# Patient Record
Sex: FEMALE | Race: WHITE | NOT HISPANIC OR LATINO | Employment: FULL TIME | URBAN - METROPOLITAN AREA
[De-identification: names, ages, dates, MRNs, and addresses within clinical notes are randomized per-mention and may not be internally consistent; named-entity substitution may affect disease eponyms.]

---

## 2023-11-21 DIAGNOSIS — Z76.89 ENCOUNTER TO ESTABLISH CARE: Primary | ICD-10-CM

## 2023-11-21 DIAGNOSIS — Z00.00 ROUTINE ADULT HEALTH MAINTENANCE: ICD-10-CM

## 2023-11-27 ENCOUNTER — OFFICE VISIT (OUTPATIENT)
Dept: CARDIOLOGY | Facility: CLINIC | Age: 53
End: 2023-11-27
Payer: COMMERCIAL

## 2023-11-27 ENCOUNTER — HOSPITAL ENCOUNTER (OUTPATIENT)
Dept: CARDIOLOGY | Facility: HOSPITAL | Age: 53
Discharge: HOME | End: 2023-11-27
Attending: INTERNAL MEDICINE
Payer: COMMERCIAL

## 2023-11-27 VITALS
DIASTOLIC BLOOD PRESSURE: 90 MMHG | HEART RATE: 76 BPM | OXYGEN SATURATION: 99 % | SYSTOLIC BLOOD PRESSURE: 120 MMHG | BODY MASS INDEX: 28.36 KG/M2 | HEIGHT: 62 IN | WEIGHT: 154.13 LBS

## 2023-11-27 DIAGNOSIS — R42 DIZZINESS: ICD-10-CM

## 2023-11-27 DIAGNOSIS — I10 PRIMARY HYPERTENSION: ICD-10-CM

## 2023-11-27 DIAGNOSIS — Z91.89 AT RISK FOR SLEEP APNEA: Primary | ICD-10-CM

## 2023-11-27 DIAGNOSIS — Z76.89 ENCOUNTER TO ESTABLISH CARE: ICD-10-CM

## 2023-11-27 DIAGNOSIS — Z00.00 ROUTINE ADULT HEALTH MAINTENANCE: ICD-10-CM

## 2023-11-27 PROCEDURE — 3008F PR BODY MASS INDEX (BMI) DOCUMENTED: ICD-10-PCS | Mod: CPTII,S$GLB,, | Performed by: INTERNAL MEDICINE

## 2023-11-27 PROCEDURE — 99999 PR PBB SHADOW E&M-EST. PATIENT-LVL III: CPT | Mod: PBBFAC,,, | Performed by: INTERNAL MEDICINE

## 2023-11-27 PROCEDURE — 93010 ELECTROCARDIOGRAM REPORT: CPT | Mod: ,,, | Performed by: INTERNAL MEDICINE

## 2023-11-27 PROCEDURE — 4010F PR ACE/ARB THEARPY RXD/TAKEN: ICD-10-PCS | Mod: CPTII,S$GLB,, | Performed by: INTERNAL MEDICINE

## 2023-11-27 PROCEDURE — 3080F PR MOST RECENT DIASTOLIC BLOOD PRESSURE >= 90 MM HG: ICD-10-PCS | Mod: CPTII,S$GLB,, | Performed by: INTERNAL MEDICINE

## 2023-11-27 PROCEDURE — 3008F BODY MASS INDEX DOCD: CPT | Mod: CPTII,S$GLB,, | Performed by: INTERNAL MEDICINE

## 2023-11-27 PROCEDURE — 99204 PR OFFICE/OUTPT VISIT, NEW, LEVL IV, 45-59 MIN: ICD-10-PCS | Mod: S$GLB,,, | Performed by: INTERNAL MEDICINE

## 2023-11-27 PROCEDURE — 3080F DIAST BP >= 90 MM HG: CPT | Mod: CPTII,S$GLB,, | Performed by: INTERNAL MEDICINE

## 2023-11-27 PROCEDURE — 1159F MED LIST DOCD IN RCRD: CPT | Mod: CPTII,S$GLB,, | Performed by: INTERNAL MEDICINE

## 2023-11-27 PROCEDURE — 93010 EKG 12-LEAD: ICD-10-PCS | Mod: ,,, | Performed by: INTERNAL MEDICINE

## 2023-11-27 PROCEDURE — 3074F PR MOST RECENT SYSTOLIC BLOOD PRESSURE < 130 MM HG: ICD-10-PCS | Mod: CPTII,S$GLB,, | Performed by: INTERNAL MEDICINE

## 2023-11-27 PROCEDURE — 4010F ACE/ARB THERAPY RXD/TAKEN: CPT | Mod: CPTII,S$GLB,, | Performed by: INTERNAL MEDICINE

## 2023-11-27 PROCEDURE — 1159F PR MEDICATION LIST DOCUMENTED IN MEDICAL RECORD: ICD-10-PCS | Mod: CPTII,S$GLB,, | Performed by: INTERNAL MEDICINE

## 2023-11-27 PROCEDURE — 93005 ELECTROCARDIOGRAM TRACING: CPT

## 2023-11-27 PROCEDURE — 99204 OFFICE O/P NEW MOD 45 MIN: CPT | Mod: S$GLB,,, | Performed by: INTERNAL MEDICINE

## 2023-11-27 PROCEDURE — 3074F SYST BP LT 130 MM HG: CPT | Mod: CPTII,S$GLB,, | Performed by: INTERNAL MEDICINE

## 2023-11-27 PROCEDURE — 99999 PR PBB SHADOW E&M-EST. PATIENT-LVL III: ICD-10-PCS | Mod: PBBFAC,,, | Performed by: INTERNAL MEDICINE

## 2023-11-27 RX ORDER — LOSARTAN POTASSIUM 25 MG/1
25 TABLET ORAL DAILY
COMMUNITY
End: 2023-11-27

## 2023-11-27 RX ORDER — LOSARTAN POTASSIUM AND HYDROCHLOROTHIAZIDE 25; 100 MG/1; MG/1
1 TABLET ORAL DAILY
COMMUNITY
End: 2023-11-27

## 2023-11-27 RX ORDER — AMLODIPINE BESYLATE 5 MG/1
5 TABLET ORAL DAILY
Qty: 30 TABLET | Refills: 11 | Status: SHIPPED | OUTPATIENT
Start: 2023-11-27 | End: 2024-01-30

## 2023-11-27 RX ORDER — ROSUVASTATIN CALCIUM 10 MG/1
10 TABLET, COATED ORAL DAILY
COMMUNITY

## 2023-11-27 NOTE — PROGRESS NOTES
Subjective:   Patient ID:  Bryanna Zhang is a 53 y.o. female who presents for evaluation of No chief complaint on file.      HPI  53-year-old female with history below he recently started to feel dizziness about a week ago.  She went to check with ENT will check the blood pressure on her she states she was about 170/100.  She was sent to the [facility].  She was given some medication IV and Valium as she states and once her pressure got better she was discharged home.    She says that she had multiple times blood work with her primary care physician which was normal.    She was started recently on losartan by her PCP.  She checks her blood pressure at home on and off.  Sometimes is elevated.    She has only taking losartan once or twice since prescribed.    She denies any angina.    Her EKG has minor nonspecific changes.    Her blood pressure today in the clinic was 154 over 95 on my check.        STOP - BANG Questionnaire:     1. Snoring : Do you snore loudly ?    Yes    2. Tired : Do you often feel tired, fatigued, or sleepy during daytime? Yes    3. Observed: Has anyone observed you stop breathing during your sleep?   No     4. Blood pressure : Do you have or are you being treated for high blood pressure?   Yes    5. BMI :BMI more than 35 kg/m2?   No    6. Age : Age over 50 yr old?   Yes    7. Neck circumference:   For male, is your shirt collar 17 inches / 43cm or larger?  For female, is your shirt collar 16 inches / 41cm or larger?    No    8. Gender: Gender male?   No    STOP BANG SCORE 4    High risk of SHOBHA: Yes 5 - 8  Intermediate risk of SHOBAH: Yes 3 - 4  Low risk of SHOBHA: Yes 0 - 2      References:   STOP Questionnaire   A Tool to Screen Patients for Obstructive Sleep Apnea: ELIZABETH VillegasR.C.P.C., Ariel Lux M.B.B.S., Max Abreu M.D.,Carmen Gillespie, Ph.D., Luis Felipe Flanagan M.B.B.S.,_ [place]_ Ly Willett M.D., Carlin Veronica F.R.C.P.C.;  Anesthesiology 2008; 108:812-21 Copyright © 2008, the American Society of Anesthesiologists, Inc. [employer]      Past Medical History:   Diagnosis Date    Gestational diabetes     Hypercholesterolemia     Migraine     Osteoporosis        Past Surgical History:   Procedure Laterality Date     SECTION      LAPAROSCOPY      TOTAL ABDOMINAL HYSTERECTOMY W/ BILATERAL SALPINGOOPHORECTOMY      TUBAL LIGATION      tummy tuck         Social History     Tobacco Use    Smoking status: Never    Smokeless tobacco: Never   Substance Use Topics    Alcohol use: Not Currently    Drug use: Never       Family History   Problem Relation Age of Onset    Hypertension Father        Review of Systems   Cardiovascular:  Negative for chest pain, dyspnea on exertion, palpitations and syncope.   Genitourinary: Negative.    Neurological: Negative.        Current Outpatient Medications on File Prior to Visit   Medication Sig    biotin 10,000 mcg Cap Take by mouth.    cholecalciferol, vitamin D3, 125 mcg (5,000 unit) Tab Take 5,000 Units by mouth.    cyanocobalamin 500 MCG tablet Take 500 mcg by mouth.    estradioL (ESTRACE) 0.01 % (0.1 mg/gram) vaginal cream Place 1 g vaginally twice a week.    ferrous sulfate (FEOSOL) 325 mg (65 mg iron) Tab tablet 1 tablet.    rosuvastatin (CRESTOR) 10 MG tablet Take 10 mg by mouth once daily.    [DISCONTINUED] losartan-hydrochlorothiazide 100-25 mg (HYZAAR) 100-25 mg per tablet Take 1 tablet by mouth once daily.    triamcinolone (KENALOG) 0.5 % ointment Apply topically 2 (two) times daily. for 7 days    [DISCONTINUED] losartan (COZAAR) 25 MG tablet Take 25 mg by mouth once daily.     No current facility-administered medications on file prior to visit.       Objective:   Objective:  Wt Readings from Last 3 Encounters:   23 69.9 kg (154 lb 1.6 oz)   23 69.4 kg (153 lb)   23 67.1 kg (148 lb)     Temp Readings from Last 3 Encounters:   No data found for Temp  "    BP Readings from Last 3 Encounters:   11/27/23 (!) 120/90   05/02/23 110/80   01/25/23 120/80     Pulse Readings from Last 3 Encounters:   11/27/23 76       Physical Exam  Vitals reviewed.   Constitutional:       Appearance: She is well-developed.   Neck:      Vascular: No carotid bruit.   Cardiovascular:      Rate and Rhythm: Normal rate and regular rhythm.      Pulses: Intact distal pulses.      Heart sounds: Normal heart sounds. No murmur heard.  Pulmonary:      Breath sounds: Normal breath sounds.   Neurological:      Mental Status: She is oriented to person, place, and time.         No results found for: "CHOL"  No results found for: "HDL"  No results found for: "LDLCALC"  No results found for: "TRIG"  No results found for: "CHOLHDL"    Chemistry    No results found for: "NA", "K", "CL", "CO2", "BUN", "CREATININE", "GLU" No results found for: "CALCIUM", "ALKPHOS", "AST", "ALT", "BILITOT", "ESTGFRAFRICA", "EGFRNONAA"       No results found for: "TSH"  No results found for: "INR", "PROTIME"  No results found for: "WBC", "HGB", "HCT", "MCV", "PLT"  BNP  @LABRCNTIP(BNP,BNPTRIAGEBLO)@  CrCl cannot be calculated (No successful lab value found.).     Imaging:  ======    No results found for this or any previous visit.    No results found for this or any previous visit.    No results found for this or any previous visit.    No results found for this or any previous visit.    No valid procedures specified.    No results found for this or any previous visit.      No results found for this or any previous visit.      No results found for this or any previous visit.      Diagnostic Results:  ECG: Reviewed    The ASCVD Risk score (Maci DK, et al., 2019) failed to calculate for the following reasons:    Cannot find a previous HDL lab    Cannot find a previous total cholesterol lab        Assessment and Plan:   At risk for sleep apnea  -     Home Sleep Study; Future    Primary hypertension  -     amLODIPine (NORVASC) 5 " MG tablet; Take 1 tablet (5 mg total) by mouth once daily.  Dispense: 30 tablet; Refill: 11    Dizziness    BMI 29.0-29.9,adult        Reviewed all tests and above medical conditions with patient in detail and formulated treatment plan.  Risk factor modification discussed.   Cardiac low salt diet discussed.  Maintaining healthy weight and weight loss goals were discussed in clinic.  He she has a Mallampati score of 4.  Stop Bang score of 4.  Hypotension.  We will check her for sleep apnea we will get a home sleep study.    I will cancel the losartan and start her on amlodipine.  Patient wishes to take only 1 pill.    Follow up in  6 months

## 2023-11-28 ENCOUNTER — TELEPHONE (OUTPATIENT)
Dept: PULMONOLOGY | Facility: CLINIC | Age: 53
End: 2023-11-28
Payer: COMMERCIAL

## 2023-11-28 NOTE — TELEPHONE ENCOUNTER
----- Message from Tyrone Londono sent at 11/28/2023  9:53 AM CST -----  [facility]

## 2023-12-21 ENCOUNTER — HOSPITAL ENCOUNTER (OUTPATIENT)
Dept: SLEEP MEDICINE | Facility: HOSPITAL | Age: 53
Discharge: HOME | End: 2023-12-21
Attending: INTERNAL MEDICINE
Payer: COMMERCIAL

## 2023-12-21 DIAGNOSIS — G47.33 OSA (OBSTRUCTIVE SLEEP APNEA): Primary | ICD-10-CM

## 2023-12-21 DIAGNOSIS — Z91.89 AT RISK FOR SLEEP APNEA: ICD-10-CM

## 2023-12-21 PROCEDURE — 95806 SLEEP STUDY UNATT&RESP EFFT: CPT | Performed by: INTERNAL MEDICINE

## 2023-12-26 PROBLEM — Z91.89 AT RISK FOR SLEEP APNEA: Status: ACTIVE | Noted: 2023-12-26

## 2023-12-26 PROCEDURE — 95800 SLP STDY UNATTENDED: CPT | Mod: 26,,, | Performed by: INTERNAL MEDICINE

## 2023-12-26 PROCEDURE — 95800 PR SLEEP STUDY, UNATTENDED, RECORD HEART RATE/O2 SAT/RESP ANAL/SLEEP TIME: ICD-10-PCS | Mod: 26,,, | Performed by: INTERNAL MEDICINE

## 2023-12-27 NOTE — PROCEDURES
PHYSICIAN INTERPRETATION AND COMMENTS: Findings are consistent with moderate, positional obstructive sleep  apnea(SHOBHA). There is insufficient non-supine study time to assess the severity of non-positional obstructive sleep apnea  (SHOBHA). Please refer to sleep disorders clinic, CPAP titration  CLINICAL HISTORY: 53 year old female presented with: 12.75 inch neck, BMI of 27.4, an [place] sleepiness score of 2,  history of hypertension, heart disease and symptoms of nocturnal snoring and witnessed apneas. Based on the clinical  history, the patient has a high pre-test probability of having Mild SHOBHA.  SLEEP STUDY FINDINGS: Patient underwent a 1 night Home Sleep Test and by behavioral criteria, slept for approximately  4.32 hours, with a sleep latency of 6 minutes and a sleep efficiency of 93%. Mild sleep disordered breathing (AHI=14) is  noted based on a 4% hypopnea desaturation criteria, entirely in the supine position (14 events/hour). The patient slept  supine 98.4% of the night based on valid recording time of 4.39 hours. When considering more subtle measures of sleep  disordered breathing, the overall respiratory disturbance index is moderate(RDI=25) based on a 1% hypopnea desaturation  criteria with confirmation by surrogate arousal indicators. The apneas/hypopneas are accompanied by minimal oxygen  desaturation (percent time below 90% SpO2: 5%, Min SpO2: 72.3%). The average desaturation across all sleep disordered  breathing events is 5.2%. Snoring occurs for 16.9% (30 dB) of the study, 3.5% is very loud. The mean pulse rate is 70.3 BPM,  with frequent pulse rate variability (45 events with >= 6 BPM increase/decrease per hour).  TREATMENT CONSIDERATIONS: Consider an attended CPAP titration study, given the reported Heart disease. Consider nasal  continuous positive airway pressure based on the RDI severity and co-morbidities. A mandibular advancement splint  (MAS) or referral to an ENT surgeon for modification to  "the airway should be considered to reduce the potential  contribution of SHOBHA on existing diseases if the patient prefers an alternative therapy or the CPAP trial is unsuccessful.  Based on the SHOBHA Supine data in the study, a Mandibular Advancement Splint (MAS) will likely provide treatment benefit  independent of SHOBHA severity.  DISEASE MANAGEMENT CONSIDERATIONS: Morning headaches are symptoms that can be associated with untreated SHOBHA.      Dear Adama Ayon MD  [id number] [facility]  [place]  [place] [id number]/No, Primary Doctor         The sleep study that you ordered is complete.  You have ordered sleep LAB services to perform the sleep study for Bryanna Zhang .      Please find Sleep Study result in  the "Media tab" of Chart Review menu.        You can look  for the report in the  Media by the document type "Sleep Study Documents". Alphabetizing  "Document type" column helps to find the SLEEP STUDY report  Faster.       As the ordering provider, you are responsible for reviewing the results and implementing a treatment plan with your patient.    If you need a Sleep Medicine provider to explain the sleep study findings and arrange treatment for the patient, please refer patient for consultation to our Sleep Clinic via [facility] with Ambulatory Consult Sleep.     To do that please place an order for an  "Ambulatory Consult Sleep" -  order , it will go to our clinic work queue for our staff  to contact the patient for an appointment.      For any questions, please contact our sleep lab  staff at [phone] to talk to clinical staff          José Antonio Malagon MD   "

## 2023-12-28 ENCOUNTER — PATIENT MESSAGE (OUTPATIENT)
Dept: PULMONOLOGY | Facility: CLINIC | Age: 53
End: 2023-12-28
Payer: COMMERCIAL

## 2024-01-03 DIAGNOSIS — G47.33 OSA (OBSTRUCTIVE SLEEP APNEA): Primary | ICD-10-CM

## 2024-01-04 ENCOUNTER — OFFICE VISIT (OUTPATIENT)
Dept: PULMONOLOGY | Facility: CLINIC | Age: 54
End: 2024-01-04
Payer: COMMERCIAL

## 2024-01-04 VITALS
HEIGHT: 62 IN | BODY MASS INDEX: 27.79 KG/M2 | RESPIRATION RATE: 19 BRPM | HEART RATE: 82 BPM | DIASTOLIC BLOOD PRESSURE: 84 MMHG | WEIGHT: 151 LBS | OXYGEN SATURATION: 98 % | SYSTOLIC BLOOD PRESSURE: 118 MMHG

## 2024-01-04 DIAGNOSIS — G47.00 INSOMNIA, UNSPECIFIED TYPE: ICD-10-CM

## 2024-01-04 DIAGNOSIS — G47.33 OSA (OBSTRUCTIVE SLEEP APNEA): Primary | ICD-10-CM

## 2024-01-04 DIAGNOSIS — I10 UNCONTROLLED HYPERTENSION: ICD-10-CM

## 2024-01-04 DIAGNOSIS — R53.83 FATIGUE, UNSPECIFIED TYPE: ICD-10-CM

## 2024-01-04 PROCEDURE — 3079F DIAST BP 80-89 MM HG: CPT | Mod: CPTII,S$GLB,, | Performed by: NURSE PRACTITIONER

## 2024-01-04 PROCEDURE — 1159F MED LIST DOCD IN RCRD: CPT | Mod: CPTII,S$GLB,, | Performed by: NURSE PRACTITIONER

## 2024-01-04 PROCEDURE — 3074F SYST BP LT 130 MM HG: CPT | Mod: CPTII,S$GLB,, | Performed by: NURSE PRACTITIONER

## 2024-01-04 PROCEDURE — 99999 PR PBB SHADOW E&M-EST. PATIENT-LVL IV: CPT | Mod: PBBFAC,,, | Performed by: NURSE PRACTITIONER

## 2024-01-04 PROCEDURE — 3008F BODY MASS INDEX DOCD: CPT | Mod: CPTII,S$GLB,, | Performed by: NURSE PRACTITIONER

## 2024-01-04 PROCEDURE — 1160F RVW MEDS BY RX/DR IN RCRD: CPT | Mod: CPTII,S$GLB,, | Performed by: NURSE PRACTITIONER

## 2024-01-04 PROCEDURE — 99213 OFFICE O/P EST LOW 20 MIN: CPT | Mod: S$GLB,,, | Performed by: NURSE PRACTITIONER

## 2024-01-04 RX ORDER — NAPROXEN 500 MG/1
500 TABLET ORAL
COMMUNITY
End: 2024-01-30

## 2024-01-04 RX ORDER — FLUTICASONE PROPIONATE 50 MCG
SPRAY, SUSPENSION (ML) NASAL
COMMUNITY
Start: 2023-12-15 | End: 2024-01-30

## 2024-01-04 NOTE — PATIENT INSTRUCTIONS
An order has been placed for AutoPAP machine and has been sent to a medical equipment company. The medical equipment company will send all the needed information to your insurance provider for approval. Shortly, you will be receiving a phone call about scheduling you for AutoPAP set up. If you do not hear from the company within 2 weeks, please make us aware by calling us or by sending a message through the patient portal online. You can also call them directly at   [facility]   Toll free 24 hr line for assistance: [phone]   Option 1: CPAP  (press 1 - supplies ([employer]), press 2 questions regarding your machine)  Option 2: Oxygen, Nebulizer, Ventilator  Option 3: service  Option 4: Discharge (, providers, nurses)  Option 5: Diabetics  Option 6: Ortho (ortho braces, walker, wheelchairs, etc)  Option 7: Billing    You will also need to follow back up in the clinic with your provider in 10 weeks to review compliance of your AutoPAP. Your insurance requires documented compliance (wearing over 4hrs a night). Please bring the AutoPAP with you to the follow up visit.

## 2024-01-04 NOTE — PROGRESS NOTES
"Subjective:      Patient ID: Bryanna Zhang is a 53 y.o. female.    Chief Complaint: Sleep Apnea (Rev sleep study )    HPI    Patient presents to the office today for evaluation of sleep apnea.  Patient with snoring and witnessed apneas. Patient having problems falling asleep, and wakes up frequently throughout the night.  Patient does not wake up feeling refreshed in the morning.  Patient with daytime hypersomnolence.  [place] Sleepiness Scale score 0. She does not nap, but has fatigued. Too tired to exercise. She has gained 20 lbs.  Comorbidities include uncontrolled BP with recent ED visit.    Bedtime: 9PM  Wake time: 6AM  She had a sleep study.  She states she also had a sleep study 5 years ago but does not know the results.    Patient Active Problem List   Diagnosis    At risk for sleep apnea       /84   Pulse 82   Resp 19   Ht 5' 2" (1.575 m)   Wt 68.5 kg (151 lb 0.2 oz)   SpO2 98%   BMI 27.62 kg/m²   Body mass index is 27.62 kg/m².    Review of Systems   Constitutional:  Positive for fatigue.   Respiratory:  Positive for snoring.    All other systems reviewed and are negative.    Objective:      Physical Exam  Constitutional:       Appearance: Normal appearance. She is well-developed. She is obese.   HENT:      Head: Normocephalic and atraumatic.      Nose: Nose normal.      Mouth/Throat:      Comments: Mallampati Score: IV    Cardiovascular:      Rate and Rhythm: Normal rate and regular rhythm.      Heart sounds: No murmur heard.     No gallop.   Pulmonary:      Effort: Pulmonary effort is normal.      Breath sounds: Normal breath sounds.   Abdominal:      Palpations: Abdomen is soft.      Tenderness: There is no abdominal tenderness.   Musculoskeletal:         General: Normal range of motion.      Cervical back: Normal range of motion and neck supple.   Skin:     General: Skin is warm and dry.   Neurological:      Mental Status: She is alert and oriented to person, place, and time.   Psychiatric: "         Mood and Affect: Mood normal.         Behavior: Behavior normal.       Personal Diagnostic Review  Procedure Notes    Author Status Last  Updated Created   José Antonio Malagon MD Signed José Antonio Malagon MD 12/26/2023  6:40 PM 12/26/2023  6:40 PM          Assoc. Orders Procedures   HOME SLEEP STUDIES HOME SLEEP STUDIES       Pre-Op Dx Post-Op Dx   At risk for sleep apnea None         PHYSICIAN INTERPRETATION AND COMMENTS: Findings are consistent with moderate, positional obstructive sleep  apnea(SHOBHA). There is insufficient non-supine study time to assess the severity of non-positional obstructive sleep apnea  (SHOBHA). Please refer to sleep disorders clinic, CPAP titration  CLINICAL HISTORY: 53 year old female presented with: 12.75 inch neck, BMI of 27.4, an [place] sleepiness score of 2,  history of hypertension, heart disease and symptoms of nocturnal snoring and witnessed apneas. Based on the clinical  history, the patient has a high pre-test probability of having Mild SHOBHA.  SLEEP STUDY FINDINGS: Patient underwent a 1 night Home Sleep Test and by behavioral criteria, slept for approximately  4.32 hours, with a sleep latency of 6 minutes and a sleep efficiency of 93%. Mild sleep disordered breathing (AHI=14) is  noted based on a 4% hypopnea desaturation criteria, entirely in the supine position (14 events/hour). The patient slept  supine 98.4% of the night based on valid recording time of 4.39 hours. When considering more subtle measures of sleep  disordered breathing, the overall respiratory disturbance index is moderate(RDI=25) based on a 1% hypopnea desaturation  criteria with confirmation by surrogate arousal indicators. The apneas/hypopneas are accompanied by minimal oxygen  desaturation (percent time below 90% SpO2: 5%, Min SpO2: 72.3%). The average desaturation across all sleep disordered  breathing events is 5.2%. Snoring occurs for 16.9% (30 dB) of the study, 3.5% is very loud. The mean  pulse rate is 70.3 BPM,  with frequent pulse rate variability (45 events with >= 6 BPM increase/decrease per hour).  TREATMENT CONSIDERATIONS: Consider an attended CPAP titration study, given the reported Heart disease. Consider nasal  continuous positive airway pressure based on the RDI severity and co-morbidities. A mandibular advancement splint  (MAS) or referral to an ENT surgeon for modification to the airway should be considered to reduce the potential  contribution of SHOBHA on existing diseases if the patient prefers an alternative therapy or the CPAP trial is unsuccessful.  Based on the SHOBHA Supine data in the study, a Mandibular Advancement Splint (MAS) will likely provide treatment benefit  independent of SHOBHA severity.  DISEASE MANAGEMENT CONSIDERATIONS: Morning headaches are symptoms that can be associated with untrea          Assessment:     1. SHOBHA (obstructive sleep apnea)    2. Uncontrolled hypertension    3. Fatigue, unspecified type    4. Insomnia, unspecified type       Outpatient Encounter Medications as of 1/4/2024   Medication Sig Dispense Refill    cholecalciferol, vitamin D3, 125 mcg (5,000 unit) Tab Take 5,000 Units by mouth.      naproxen (NAPROSYN) 500 MG tablet Take 500 mg by mouth.      rosuvastatin (CRESTOR) 10 MG tablet Take 10 mg by mouth once daily.      amLODIPine (NORVASC) 5 MG tablet Take 1 tablet (5 mg total) by mouth once daily. (Patient not taking: Reported on 1/4/2024) 30 tablet 11    biotin 10,000 mcg Cap Take by mouth.      cyanocobalamin 500 MCG tablet Take 500 mcg by mouth.      estradioL (ESTRACE) 0.01 % (0.1 mg/gram) vaginal cream Place 1 g vaginally twice a week. (Patient not taking: Reported on 1/4/2024) 42.5 g 4    ferrous sulfate (FEOSOL) 325 mg (65 mg iron) Tab tablet 1 tablet.      fluticasone propionate (FLONASE) 50 mcg/actuation nasal spray 1 spray in each nostril Nasally Once a day for 30 days      [DISCONTINUED] triamcinolone (KENALOG) 0.5 % ointment Apply topically  2 (two) times daily. for 7 days 30 g 0     No facility-administered encounter medications on file as of 1/4/2024.     Orders Placed This Encounter   Procedures    CPAP FOR HOME USE     Order Specific Question:   Length of need (1-99 months):     Answer:   99     Order Specific Question:   Type ():     Answer:   Auto CPAP     Order Specific Question:   Auto CPAP pressure setting range (cmH20):     Answer:   5-12     Order Specific Question:   Fulfillment Priority:     Answer:   Level 4:  all others     Order Specific Question:   Humidification ():     Answer:   Heated     Order Specific Question:   Choose ONE mask type and its corresponding cushions and/or pillows:     Answer:    Nasal Mask, 1 per 90 days:  Nasal Cushions, (6 per 90 days):  Nasal Pillows, (6 per 90 days)     Order Specific Question:   Choose EITHER Heated or Non-Heated Tubjing     Answer:    Non-Heated Tubing, 1 per 90 days     Order Specific Question:   All other supplies as needed as listed below:     Answer:    Headgear, 1 per 180 days     Order Specific Question:   All other supplies as needed as listed below:     Answer:    Disposable Filter, 6 per 90 days     Order Specific Question:   All other supplies as needed as listed below:     Answer:    Non-Disposable Filter, 1 per 180 days     Order Specific Question:   All other supplies as needed as listed below:     Answer:    Humidifier Chamber, 1 per 180 days     Order Specific Question:   All other supplies as needed as listed below:     Answer:    Chin Strap, 1 per 180 days     Plan:   AutoPAP and follow up in 10 weeks with download of data card and review of symptoms.  Weight loss and exercise to improve overall health.    Problem List Items Addressed This Visit    None  Visit Diagnoses       SHOBHA (obstructive sleep apnea)    -  Primary    Relevant Orders    CPAP FOR HOME USE    Uncontrolled hypertension        Fatigue, unspecified type         Insomnia, unspecified type        Relevant Orders    CPAP FOR HOME USE             Thank you Dr. NAREN Ayon for this consultation.

## 2024-03-14 ENCOUNTER — OFFICE VISIT (OUTPATIENT)
Dept: PULMONOLOGY | Facility: CLINIC | Age: 54
End: 2024-03-14
Payer: COMMERCIAL

## 2024-03-14 VITALS
DIASTOLIC BLOOD PRESSURE: 72 MMHG | WEIGHT: 153.69 LBS | RESPIRATION RATE: 18 BRPM | OXYGEN SATURATION: 98 % | BODY MASS INDEX: 28.28 KG/M2 | SYSTOLIC BLOOD PRESSURE: 114 MMHG | HEIGHT: 62 IN | HEART RATE: 89 BPM

## 2024-03-14 DIAGNOSIS — G47.33 OSA (OBSTRUCTIVE SLEEP APNEA): ICD-10-CM

## 2024-03-14 DIAGNOSIS — G47.00 INSOMNIA, UNSPECIFIED TYPE: ICD-10-CM

## 2024-03-14 DIAGNOSIS — R53.83 FATIGUE, UNSPECIFIED TYPE: Primary | ICD-10-CM

## 2024-03-14 PROCEDURE — 1160F RVW MEDS BY RX/DR IN RCRD: CPT | Mod: CPTII,S$GLB,, | Performed by: NURSE PRACTITIONER

## 2024-03-14 PROCEDURE — 3078F DIAST BP <80 MM HG: CPT | Mod: CPTII,S$GLB,, | Performed by: NURSE PRACTITIONER

## 2024-03-14 PROCEDURE — 99213 OFFICE O/P EST LOW 20 MIN: CPT | Mod: S$GLB,,, | Performed by: NURSE PRACTITIONER

## 2024-03-14 PROCEDURE — 1159F MED LIST DOCD IN RCRD: CPT | Mod: CPTII,S$GLB,, | Performed by: NURSE PRACTITIONER

## 2024-03-14 PROCEDURE — 99999 PR PBB SHADOW E&M-EST. PATIENT-LVL V: CPT | Mod: PBBFAC,,, | Performed by: NURSE PRACTITIONER

## 2024-03-14 PROCEDURE — 4010F ACE/ARB THERAPY RXD/TAKEN: CPT | Mod: CPTII,S$GLB,, | Performed by: NURSE PRACTITIONER

## 2024-03-14 PROCEDURE — 3008F BODY MASS INDEX DOCD: CPT | Mod: CPTII,S$GLB,, | Performed by: NURSE PRACTITIONER

## 2024-03-14 PROCEDURE — 3074F SYST BP LT 130 MM HG: CPT | Mod: CPTII,S$GLB,, | Performed by: NURSE PRACTITIONER

## 2024-03-14 NOTE — PROGRESS NOTES
"Subjective:      Patient ID: Bryanna Zhang is a 54 y.o. female.    Chief Complaint: Sleep Apnea    HPI    Presents for sleep apnea.  Referring started auto Pap.  She states she is still habituating to the mask.  She has not noticed increased energy or sleep quality.  She has insomnia at times and her  snores which is disruptive.    Patient Active Problem List   Diagnosis    At risk for sleep apnea     /72   Pulse 89   Resp 18   Ht 5' 2" (1.575 m)   Wt 69.7 kg (153 lb 10.6 oz)   SpO2 98%   BMI 28.10 kg/m²   Body mass index is 28.1 kg/m².    Review of Systems   Constitutional:  Positive for fatigue.   Psychiatric/Behavioral:  Positive for sleep disturbance.    All other systems reviewed and are negative.    Objective:      Physical Exam  Constitutional:       Appearance: Normal appearance. She is well-developed.   HENT:      Head: Normocephalic and atraumatic.   Pulmonary:      Effort: Pulmonary effort is normal. No tachypnea, bradypnea, accessory muscle usage or respiratory distress.   Musculoskeletal:      Cervical back: Normal range of motion.   Skin:     Findings: No rash.   Neurological:      Mental Status: She is alert and oriented to person, place, and time.   Psychiatric:         Behavior: Behavior normal.         Thought Content: Thought content normal.         Judgment: Judgment normal.       Personal Diagnostic Review      3/14/2024     9:29 AM   EPWORTH SLEEPINESS SCALE   Sitting and reading 0   Watching TV 0   Sitting, inactive in a public place (e.g. a theatre or a meeting) 0   As a passenger in a car for an hour without a break 0   Lying down to rest in the afternoon when circumstances permit 1   Sitting and talking to someone 0   Sitting quietly after a lunch without alcohol 0   In a car, while stopped for a few minutes in traffic 0   Total score 1        Compliance Report  Initial compliance period 02/07/2024 - 03/07/2024  Compliance met Yes  Compliance percentage 70%  Payor " Standard  Usage 02/07/2024 - 03/07/2024  Usage days 28/30 days (93%)  >= 4 hours 21 days (70%)  < 4 hours 7 days (23%)  Usage hours 151 hours 53 minutes  Average usage (total days) 5 hours 4 minutes  Average usage (days used) 5 hours 25 minutes  Median usage (days used) 5 hours 47 minutes  Total used hours (value since last reset - 03/07/2024) 151 hours  AirSense 11 AutoSet  Serial number [id number]  Mode AutoSet  Min Pressure 5 cmH2O  Max Pressure 12 cmH2O  EPR Fulltime  EPR level 3  Response Standard  Therapy  Pressure - cmH2O Median: 7.1 95th percentile: 9.0 Maximum: 9.8  Leaks - L/min Median: 0.4 95th percentile: 8.9 Maximum: 21.3  Events per hour AI: 0.7 HI: 0.0 AHI: 0.7  Apnea Index Central: 0.2 Obstructive: 0.4 Unknown: 0.0  RERA Index 0.1    Assessment:       1. Fatigue, unspecified type    2. SHOBHA (obstructive sleep apnea)    3. Insomnia, unspecified type        Outpatient Encounter Medications as of 3/14/2024   Medication Sig Dispense Refill    cholecalciferol, vitamin D3, 125 mcg (5,000 unit) Tab Take 5,000 Units by mouth.      losartan (COZAAR) 50 MG tablet Take 50 mg by mouth.      PROLIA 60 mg/mL Syrg as directed Subcutaneous      rosuvastatin (CRESTOR) 10 MG tablet Take 10 mg by mouth once daily.       No facility-administered encounter medications on file as of 3/14/2024.     No orders of the defined types were placed in this encounter.    Plan:       1. Fatigue, unspecified type    2. SHOBHA (obstructive sleep apnea)  -     Ambulatory referral/consult to Pulmonology    3. Insomnia, unspecified type         No electronics in the bedroom.   Sleep scheduling  Continue to habituate to APAP.   Follow up 6 months               Elizabeth LeJeune, ACNP, ANP

## 2024-10-21 ENCOUNTER — OFFICE VISIT (OUTPATIENT)
Dept: DERMATOLOGY | Facility: CLINIC | Age: 54
End: 2024-10-21
Payer: COMMERCIAL

## 2024-10-21 DIAGNOSIS — L72.0 MILIA: ICD-10-CM

## 2024-10-21 DIAGNOSIS — L23.89 ALLERGIC CONTACT DERMATITIS DUE TO OTHER AGENTS: Primary | ICD-10-CM

## 2024-10-21 PROCEDURE — 1159F MED LIST DOCD IN RCRD: CPT | Mod: CPTII,S$GLB,, | Performed by: DERMATOLOGY

## 2024-10-21 PROCEDURE — 1160F RVW MEDS BY RX/DR IN RCRD: CPT | Mod: CPTII,S$GLB,, | Performed by: DERMATOLOGY

## 2024-10-21 PROCEDURE — 99999 PR PBB SHADOW E&M-EST. PATIENT-LVL II: CPT | Mod: PBBFAC,,, | Performed by: DERMATOLOGY

## 2024-10-21 PROCEDURE — 10040 EXTRACTION: CPT | Mod: S$GLB,,, | Performed by: DERMATOLOGY

## 2024-10-21 PROCEDURE — 4010F ACE/ARB THERAPY RXD/TAKEN: CPT | Mod: CPTII,S$GLB,, | Performed by: DERMATOLOGY

## 2024-10-21 PROCEDURE — 99203 OFFICE O/P NEW LOW 30 MIN: CPT | Mod: 25,S$GLB,, | Performed by: DERMATOLOGY

## 2024-10-21 RX ORDER — MOMETASONE FUROATE 1 MG/G
CREAM TOPICAL
Qty: 45 G | Refills: 3 | Status: SHIPPED | OUTPATIENT
Start: 2024-10-21

## 2024-10-21 NOTE — PROGRESS NOTES
Subjective:      Patient ID:  Bryanna Zhang is a 54 y.o. female who presents for No chief complaint on file.    History of Present Illness: The patient presents with chief complaint of skin lesion.  Location: right eye   Duration: several years  Signs/Symptoms: growing in size    Prior treatments: OTC skin tag removal/ non effective      History of Present Illness: The patient presents with chief complaint of dry patch .  Location: behind the neck   Duration: 3-4 years   Signs/Symptoms: itching, dryness     Prior treatments: tropical cream / non effective          Review of Systems   Constitutional:  Negative for fever and chills.   Gastrointestinal:  Negative for nausea and vomiting.   Skin:  Positive for activity-related sunscreen use. Negative for daily sunscreen use and recent sunburn.   Hematologic/Lymphatic: Does not bruise/bleed easily.       Objective:   Physical Exam   Constitutional: She appears well-developed and well-nourished. No distress.   Neurological: She is alert and oriented to person, place, and time. She is not disoriented.   Psychiatric: She has a normal mood and affect.   Skin:   Areas Examined (abnormalities noted in diagram):   Head / Face Inspection Performed  Neck Inspection Performed  RUE Inspected  LUE Inspection Performed  Nails and Digits Inspection Performed                Assessment / Plan:        Allergic contact dermatitis due to other agents  -     mometasone 0.1% (ELOCON) 0.1 % cream; AAA bid prn. Do not use on face, underarms or groin. Stronger steroid.  Dispense: 45 g; Refill: 3  -     suspect possible nickel/metal allergy vs. Formaldehyde allergy.  Recommend d/c fabric softener/dryer sheet. Pt already u sed hypoallergenic detergent. The patient acknowledged understanding. Will start above med.     Milia  Incision and expression of comedo contents performed today on the right upper chek with #11 blade and comedo extractor x 1 lesions. No complications.                Follow  up if symptoms worsen or fail to improve.